# Patient Record
Sex: MALE | URBAN - METROPOLITAN AREA
[De-identification: names, ages, dates, MRNs, and addresses within clinical notes are randomized per-mention and may not be internally consistent; named-entity substitution may affect disease eponyms.]

---

## 2020-09-22 ENCOUNTER — HOSPITAL ENCOUNTER (OUTPATIENT)
Age: 70
Setting detail: SPECIMEN
Discharge: HOME | End: 2020-09-22
Payer: MEDICARE

## 2020-09-28 LAB — DERMATOLOGY PATHOLOGY REPORT: NORMAL

## 2020-12-14 ENCOUNTER — HOSPITAL ENCOUNTER (OUTPATIENT)
Age: 70
Setting detail: SPECIMEN
Discharge: HOME | End: 2020-12-14
Payer: MEDICARE

## 2020-12-16 LAB — DERMATOLOGY PATHOLOGY REPORT: NORMAL

## 2021-11-12 ENCOUNTER — HOSPITAL ENCOUNTER (OUTPATIENT)
Age: 71
Setting detail: SPECIMEN
Discharge: HOME | End: 2021-11-12
Payer: MEDICARE

## 2021-11-16 LAB — DERMATOLOGY PATHOLOGY REPORT: NORMAL

## 2023-09-25 ENCOUNTER — TELEPHONE (OUTPATIENT)
Age: 73
End: 2023-09-25

## 2023-09-25 ENCOUNTER — OFFICE VISIT (OUTPATIENT)
Age: 73
End: 2023-09-25

## 2023-09-25 DIAGNOSIS — H91.93 DECREASED HEARING OF BOTH EARS: Primary | ICD-10-CM

## 2023-09-25 NOTE — PROGRESS NOTES
Ear flush done bilaterally with warm water and H2O2. Patient tolerated well.  was successful  Curette not used   Dr MATA advised.

## 2023-09-25 NOTE — TELEPHONE ENCOUNTER
Both ears plugged. Would like to have nurse flush. Symptoms started over the weekend.  Notify pt ok to schedule nurse visit

## 2023-09-27 ENCOUNTER — OFFICE VISIT (OUTPATIENT)
Age: 73
End: 2023-09-27

## 2023-09-27 VITALS
DIASTOLIC BLOOD PRESSURE: 80 MMHG | RESPIRATION RATE: 16 BRPM | BODY MASS INDEX: 45.45 KG/M2 | TEMPERATURE: 97.2 F | WEIGHT: 272.8 LBS | HEIGHT: 65 IN | OXYGEN SATURATION: 95 % | SYSTOLIC BLOOD PRESSURE: 126 MMHG | HEART RATE: 69 BPM

## 2023-09-27 DIAGNOSIS — M79.18 MYOFASCIAL MUSCLE PAIN: Primary | ICD-10-CM

## 2023-09-27 DIAGNOSIS — G20.A1 PARKINSON'S DISEASE: ICD-10-CM

## 2023-09-27 DIAGNOSIS — I10 BENIGN ESSENTIAL HYPERTENSION: ICD-10-CM

## 2023-09-27 PROBLEM — N39.41 URGE INCONTINENCE: Status: ACTIVE | Noted: 2021-12-15

## 2023-09-27 PROBLEM — N40.0 BENIGN PROSTATIC HYPERPLASIA: Status: ACTIVE | Noted: 2023-07-20

## 2023-09-27 PROBLEM — M54.50 ACUTE BILATERAL LOW BACK PAIN WITHOUT SCIATICA: Status: ACTIVE | Noted: 2018-11-01

## 2023-09-27 PROBLEM — M51.26 HERNIATED LUMBAR INTERVERTEBRAL DISC: Status: ACTIVE | Noted: 2017-05-10

## 2023-09-27 PROBLEM — M75.22 BICEPS TENDONITIS ON LEFT: Status: ACTIVE | Noted: 2017-05-10

## 2023-09-27 PROBLEM — M54.31 SCIATICA OF RIGHT SIDE: Status: ACTIVE | Noted: 2018-02-20

## 2023-09-27 PROBLEM — M48.00 SPINAL STENOSIS: Status: ACTIVE | Noted: 2022-09-22

## 2023-09-27 PROBLEM — N28.1 RENAL CYST: Status: ACTIVE | Noted: 2017-11-08

## 2023-09-27 PROBLEM — R07.9 CHEST PAIN: Status: ACTIVE | Noted: 2021-01-29

## 2023-09-27 PROBLEM — N45.1 EPIDIDYMITIS: Status: ACTIVE | Noted: 2017-10-18

## 2023-09-27 PROBLEM — Z96.652 S/P LEFT UNICOMPARTMENTAL KNEE REPLACEMENT: Status: ACTIVE | Noted: 2017-09-26

## 2023-09-27 PROBLEM — M17.11 PRIMARY OSTEOARTHRITIS OF RIGHT KNEE: Status: ACTIVE | Noted: 2018-02-13

## 2023-09-27 PROBLEM — N52.01 ERECTILE DYSFUNCTION DUE TO ARTERIAL INSUFFICIENCY: Status: ACTIVE | Noted: 2017-08-24

## 2023-09-27 PROBLEM — T84.012A FAILED TOTAL KNEE, RIGHT (HCC): Status: ACTIVE | Noted: 2018-01-29

## 2023-09-27 RX ORDER — AMANTADINE HYDROCHLORIDE 100 MG/1
100 CAPSULE, GELATIN COATED ORAL DAILY
COMMUNITY

## 2023-09-27 RX ORDER — DUTASTERIDE 0.5 MG/1
1 CAPSULE, LIQUID FILLED ORAL DAILY
COMMUNITY
Start: 2023-02-06

## 2023-09-27 RX ORDER — POTASSIUM CHLORIDE 750 MG/1
10 CAPSULE, EXTENDED RELEASE ORAL DAILY
COMMUNITY

## 2023-09-27 RX ORDER — MELOXICAM 15 MG/1
15 TABLET ORAL DAILY
COMMUNITY

## 2023-09-27 RX ORDER — AMLODIPINE BESYLATE 10 MG/1
10 TABLET ORAL DAILY
COMMUNITY

## 2023-09-27 RX ORDER — GABAPENTIN 800 MG/1
1 TABLET ORAL DAILY
COMMUNITY
Start: 2023-09-10

## 2023-09-27 RX ORDER — TRIAMCINOLONE ACETONIDE 40 MG/ML
40 INJECTION, SUSPENSION INTRA-ARTICULAR; INTRAMUSCULAR ONCE
Status: COMPLETED | OUTPATIENT
Start: 2023-09-27 | End: 2023-09-27

## 2023-09-27 RX ORDER — MONTELUKAST SODIUM 4 MG/1
1 TABLET, CHEWABLE ORAL 2 TIMES DAILY
COMMUNITY
Start: 2023-06-05

## 2023-09-27 RX ORDER — NITROGLYCERIN 0.4 MG/1
1 TABLET SUBLINGUAL PRN
COMMUNITY

## 2023-09-27 RX ORDER — CARBIDOPA AND LEVODOPA 25; 100 MG/1; MG/1
1 TABLET, EXTENDED RELEASE ORAL DAILY
COMMUNITY
Start: 2022-10-26

## 2023-09-27 RX ORDER — TAMSULOSIN HYDROCHLORIDE 0.4 MG/1
1 CAPSULE ORAL DAILY
COMMUNITY
Start: 2023-08-19

## 2023-09-27 RX ORDER — ISOSORBIDE MONONITRATE 60 MG/1
1 TABLET, EXTENDED RELEASE ORAL DAILY
COMMUNITY
Start: 2023-07-19

## 2023-09-27 RX ADMIN — TRIAMCINOLONE ACETONIDE 40 MG: 40 INJECTION, SUSPENSION INTRA-ARTICULAR; INTRAMUSCULAR at 15:03

## 2023-09-27 SDOH — ECONOMIC STABILITY: HOUSING INSECURITY
IN THE LAST 12 MONTHS, WAS THERE A TIME WHEN YOU DID NOT HAVE A STEADY PLACE TO SLEEP OR SLEPT IN A SHELTER (INCLUDING NOW)?: NO

## 2023-09-27 SDOH — ECONOMIC STABILITY: INCOME INSECURITY: HOW HARD IS IT FOR YOU TO PAY FOR THE VERY BASICS LIKE FOOD, HOUSING, MEDICAL CARE, AND HEATING?: NOT HARD AT ALL

## 2023-09-27 SDOH — ECONOMIC STABILITY: FOOD INSECURITY: WITHIN THE PAST 12 MONTHS, THE FOOD YOU BOUGHT JUST DIDN'T LAST AND YOU DIDN'T HAVE MONEY TO GET MORE.: NEVER TRUE

## 2023-09-27 SDOH — ECONOMIC STABILITY: FOOD INSECURITY: WITHIN THE PAST 12 MONTHS, YOU WORRIED THAT YOUR FOOD WOULD RUN OUT BEFORE YOU GOT MONEY TO BUY MORE.: NEVER TRUE

## 2023-09-27 ASSESSMENT — PATIENT HEALTH QUESTIONNAIRE - PHQ9
SUM OF ALL RESPONSES TO PHQ QUESTIONS 1-9: 0
SUM OF ALL RESPONSES TO PHQ9 QUESTIONS 1 & 2: 0
SUM OF ALL RESPONSES TO PHQ QUESTIONS 1-9: 0
1. LITTLE INTEREST OR PLEASURE IN DOING THINGS: 0
2. FEELING DOWN, DEPRESSED OR HOPELESS: 0

## 2023-09-27 NOTE — PROGRESS NOTES
Kenalog 40mg was given to patient for Myofascial muscle pain. Tolerated well. Approved by Dr. Chary Scott.  Injection was given Left thigh, ABN was signed and copy was given to pt

## 2023-09-27 NOTE — PROGRESS NOTES
[facility]      Date of Visit:  2023  Patient Name: Lucho Acosta   Patient :  1950     CHIEF COMPLAINT/HPI:     Lucho Acosta is a 68 y.o. male who presents today for an general visit to be evaluated for the following condition(s):  Chief Complaint   Patient presents with    left leg swelling     Patient is here for left calf swelling for about a week. Patient states he injured it during water aerobics. States no discoloration only swelling. Patient was just here yesterday and had very successful wax removal process he is quite pleased about his hearing now. He has been going to water aerobics and boxing for Parkinson's condition and he has noticed strain of the left medial calf muscle while he was doing water aerobics. He remembers exactly when it happened. This was about a week ago it was persisting but today feels much better    REVIEW OF SYSTEM      Review of Systems    Patient has no other healthcare issues. No fever no sweats no chills. No chest pain nor shortness of breath. No nausea nor vomiting no diarrhea . No  complaints. No edema issues. REVIEWED INFORMATION      Allergies   Allergen Reactions    Ciprofloxacin      Leg cramping    Morphine Nausea And Vomiting       Current Outpatient Medications   Medication Sig Dispense Refill    colestipol (COLESTID) 1 g tablet Take 1 tablet by mouth in the morning and at bedtime      dutasteride (AVODART) 0.5 MG capsule Take 1 capsule by mouth daily      isosorbide mononitrate (IMDUR) 60 MG extended release tablet Take 1 tablet by mouth daily      meloxicam (MOBIC) 15 MG tablet Take 1 tablet by mouth daily      amLODIPine (NORVASC) 10 MG tablet Take 1 tablet by mouth daily      gabapentin (NEURONTIN) 800 MG tablet Take 1 tablet by mouth daily.       nitroGLYCERIN (NITROSTAT) 0.4 MG SL tablet Place 1 tablet under the tongue as needed      tamsulosin (FLOMAX) 0.4 MG capsule Take 1 capsule by mouth daily      carbidopa-levodopa (SINEMET

## 2023-10-16 RX ORDER — SPIRONOLACTONE 25 MG/1
25 TABLET ORAL DAILY
Qty: 90 TABLET | Refills: 3 | Status: SHIPPED | OUTPATIENT
Start: 2023-10-16

## 2023-10-17 RX ORDER — ISOSORBIDE MONONITRATE 60 MG/1
60 TABLET, EXTENDED RELEASE ORAL EVERY MORNING
Qty: 90 TABLET | Refills: 1 | Status: SHIPPED | OUTPATIENT
Start: 2023-10-17

## 2023-10-17 NOTE — TELEPHONE ENCOUNTER
Nemesio Magaña is calling to request a refill on the following medication(s):    Medication Request:  Requested Prescriptions     Pending Prescriptions Disp Refills    isosorbide mononitrate (IMDUR) 60 MG extended release tablet [Pharmacy Med Name: ISOSORBIDE MONONIT ER 60 MG TB] 90 tablet      Sig: TAKE ONE TABLET BY MOUTH EVERY MORNING       Last Visit Date (If Applicable):  Visit date not found    Next Visit Date:    11/16/2023

## 2023-11-09 RX ORDER — GABAPENTIN 800 MG/1
800 TABLET ORAL
Qty: 30 TABLET | Refills: 0 | Status: SHIPPED | OUTPATIENT
Start: 2023-11-09 | End: 2023-12-09

## 2023-11-09 NOTE — TELEPHONE ENCOUNTER
Agnieszka Cos is calling to request a refill on the following medication(s):    Medication Request:  Requested Prescriptions     Pending Prescriptions Disp Refills    gabapentin (NEURONTIN) 800 MG tablet [Pharmacy Med Name: GABAPENTIN 800 MG TABLET] 30 tablet      Sig: Take 1 tablet by mouth.        Last Visit Date (If Applicable):  Visit date not found    Next Visit Date:    11/16/2023

## 2023-11-16 ENCOUNTER — OFFICE VISIT (OUTPATIENT)
Age: 73
End: 2023-11-16

## 2023-11-16 VITALS
HEART RATE: 76 BPM | RESPIRATION RATE: 16 BRPM | DIASTOLIC BLOOD PRESSURE: 78 MMHG | HEIGHT: 65 IN | WEIGHT: 272 LBS | TEMPERATURE: 97.8 F | SYSTOLIC BLOOD PRESSURE: 126 MMHG | OXYGEN SATURATION: 95 % | BODY MASS INDEX: 45.32 KG/M2

## 2023-11-16 DIAGNOSIS — N18.32 STAGE 3B CHRONIC KIDNEY DISEASE (CKD) (HCC): ICD-10-CM

## 2023-11-16 DIAGNOSIS — E78.5 DYSLIPIDEMIA: ICD-10-CM

## 2023-11-16 DIAGNOSIS — M51.36 DDD (DEGENERATIVE DISC DISEASE), LUMBAR: ICD-10-CM

## 2023-11-16 DIAGNOSIS — G25.81 RESTLESS LEGS SYNDROME: ICD-10-CM

## 2023-11-16 DIAGNOSIS — I10 PRIMARY HYPERTENSION: ICD-10-CM

## 2023-11-16 DIAGNOSIS — R73.9 HYPERGLYCEMIA: ICD-10-CM

## 2023-11-16 DIAGNOSIS — R53.83 FATIGUE, UNSPECIFIED TYPE: ICD-10-CM

## 2023-11-16 DIAGNOSIS — I10 ESSENTIAL HYPERTENSION: Primary | ICD-10-CM

## 2023-11-16 DIAGNOSIS — F45.8 AEROPHAGIA: Primary | ICD-10-CM

## 2023-11-16 DIAGNOSIS — L85.3 XEROSIS CUTIS: ICD-10-CM

## 2023-11-16 DIAGNOSIS — B35.1 ONYCHOMYCOSIS: ICD-10-CM

## 2023-11-16 DIAGNOSIS — G20.A1 PARKINSON'S DISEASE WITHOUT DYSKINESIA, UNSPECIFIED WHETHER MANIFESTATIONS FLUCTUATE: ICD-10-CM

## 2023-11-16 DIAGNOSIS — G47.33 OBSTRUCTIVE SLEEP APNEA SYNDROME: ICD-10-CM

## 2023-11-16 DIAGNOSIS — E55.9 VITAMIN D DEFICIENCY: ICD-10-CM

## 2023-11-16 DIAGNOSIS — N40.0 BENIGN PROSTATIC HYPERPLASIA, UNSPECIFIED WHETHER LOWER URINARY TRACT SYMPTOMS PRESENT: ICD-10-CM

## 2023-11-16 PROBLEM — M51.369 DDD (DEGENERATIVE DISC DISEASE), LUMBAR: Status: ACTIVE | Noted: 2023-11-16

## 2023-11-16 PROBLEM — G47.30 SLEEP APNEA: Status: ACTIVE | Noted: 2023-11-16

## 2023-11-16 PROBLEM — K58.9 IRRITABLE BOWEL SYNDROME: Status: ACTIVE | Noted: 2023-11-16

## 2023-11-16 PROBLEM — M19.90 OSTEOARTHRITIS: Status: ACTIVE | Noted: 2023-11-16

## 2023-11-16 PROBLEM — C44.91 BASAL CELL CARCINOMA: Status: ACTIVE | Noted: 2023-11-16

## 2023-11-16 NOTE — PROGRESS NOTES
[facility]      Date of Visit:  2023  Patient Name: Bob Cortes   Patient :  1950     CHIEF COMPLAINT/HPI:     Bob Cortes is a 68 y.o. male who presents today for an general visit to be evaluated for the following condition(s):  Chief Complaint   Patient presents with    Check-Up     Patient Is here today for a 6 month checkup with labs from 23   Patient here for routine visit. Recently visited kids and grandkids in [place] but normally is doing swimming at the  for exercise. He will start back to boxing therapy when it opens at new location for his Parkinson's. When he was seen last he was having left leg pain that radiated down the leg, he had x-rays of back hips and did PT course, was given meloxicam.  Gabapentin dose was increased to 800 mg at bedtime. The pain is not gone but tolerable and slightly improved. No longer taking meloxicam.  He does not want to do anything further for it at this point. He did not schedule with pain management either. No changes in his Parkinson's meds. Sees urology regularly and they check PSA. REVIEW OF SYSTEM      Review of Systems   Musculoskeletal:  Positive for gait problem. All other systems reviewed and are negative. REVIEWED INFORMATION      Allergies   Allergen Reactions    Ciprofloxacin      Leg cramping    Lipitor [Atorvastatin] Myalgia    Morphine Nausea And Vomiting       Current Outpatient Medications   Medication Sig Dispense Refill    gabapentin (NEURONTIN) 800 MG tablet Take 1 tablet by mouth nightly for 30 days.  30 tablet 0    isosorbide mononitrate (IMDUR) 60 MG extended release tablet TAKE ONE TABLET BY MOUTH EVERY MORNING 90 tablet 1    spironolactone (ALDACTONE) 25 MG tablet TAKE ONE TABLET BY MOUTH DAILY 90 tablet 3    colestipol (COLESTID) 1 g tablet Take 1 tablet by mouth in the morning and at bedtime      dutasteride (AVODART) 0.5 MG capsule Take 1 capsule by mouth daily      amLODIPine (NORVASC) 10 MG

## 2023-11-17 NOTE — ASSESSMENT & PLAN NOTE
reviewed creatinine stable 1.14 GFR 68, strict control blood pressure to prevent decline of renal function.   Avoid NSAIDs

## 2023-11-17 NOTE — ASSESSMENT & PLAN NOTE
reviewed HDL 31 LDL 90, he had stopped statin within the last year, clarify in future if he is still on colestipol.   Continue to work on weight loss which he has been doing slowly as he had goal to try to reduce meds if possible and weight loss would bring him the best chance of being able to cut back on meds

## 2023-12-07 RX ORDER — GABAPENTIN 800 MG/1
800 TABLET ORAL NIGHTLY
Qty: 90 TABLET | Refills: 0 | Status: SHIPPED | OUTPATIENT
Start: 2023-12-07 | End: 2024-01-06

## 2023-12-07 NOTE — TELEPHONE ENCOUNTER
Alexandro Kim is calling to request a refill on the following medication(s):    Medication Request:  Requested Prescriptions     Pending Prescriptions Disp Refills    gabapentin (NEURONTIN) 800 MG tablet [Pharmacy Med Name: GABAPENTIN 800 MG TABLET] 30 tablet 0     Sig: Take 1 tablet by mouth nightly.       Last Visit Date (If Applicable):  11/16/2023    Next Visit Date:    5/21/2024

## 2024-02-16 ENCOUNTER — OFFICE VISIT (OUTPATIENT)
Age: 74
End: 2024-02-16

## 2024-02-16 VITALS
HEART RATE: 80 BPM | WEIGHT: 278 LBS | BODY MASS INDEX: 44.68 KG/M2 | HEIGHT: 66 IN | SYSTOLIC BLOOD PRESSURE: 124 MMHG | DIASTOLIC BLOOD PRESSURE: 68 MMHG | TEMPERATURE: 98.9 F | OXYGEN SATURATION: 94 % | RESPIRATION RATE: 12 BRPM

## 2024-02-16 DIAGNOSIS — N18.32 STAGE 3B CHRONIC KIDNEY DISEASE (CKD) (HCC): ICD-10-CM

## 2024-02-16 DIAGNOSIS — G20.A1 PARKINSON'S DISEASE, UNSPECIFIED WHETHER DYSKINESIA PRESENT, UNSPECIFIED WHETHER MANIFESTATIONS FLUCTUATE: ICD-10-CM

## 2024-02-16 DIAGNOSIS — N40.1 BENIGN PROSTATIC HYPERPLASIA WITH INCOMPLETE BLADDER EMPTYING: Primary | ICD-10-CM

## 2024-02-16 DIAGNOSIS — E66.01 OBESITY, CLASS III, BMI 40-49.9 (MORBID OBESITY) (HCC): ICD-10-CM

## 2024-02-16 DIAGNOSIS — R39.14 BENIGN PROSTATIC HYPERPLASIA WITH INCOMPLETE BLADDER EMPTYING: Primary | ICD-10-CM

## 2024-02-16 DIAGNOSIS — R09.81 CONGESTION OF PARANASAL SINUS: ICD-10-CM

## 2024-02-16 DIAGNOSIS — N39.41 URGE INCONTINENCE: ICD-10-CM

## 2024-02-16 DIAGNOSIS — I10 ESSENTIAL HYPERTENSION: ICD-10-CM

## 2024-02-16 RX ORDER — IPRATROPIUM BROMIDE 21 UG/1
2 SPRAY, METERED NASAL 2 TIMES DAILY PRN
Qty: 30 ML | Refills: 3 | Status: SHIPPED | OUTPATIENT
Start: 2024-02-16

## 2024-02-16 NOTE — ASSESSMENT & PLAN NOTE
November labs show creatinine stable 1.14 GFR 68, strict control blood pressure to prevent decline of renal function. Avoid NSAIDs

## 2024-02-16 NOTE — PROGRESS NOTES
[facility]     Date of Visit:  2024  Patient Name: Alexandro Kim   Patient :  1950     CHIEF COMPLAINT/HPI:     Alexandro Kim is a 73 y.o. male who presents today for an general visit to be evaluated for the following condition(s):  Chief Complaint   Patient presents with    Procedure     Urolift surg [facility]     Patient here to discuss possible urologic procedure in the near future.  He follows with urologist for BPH with LUTS.  He has been on gemtesa, Avodart and Flomax for his symptoms and he continues to have urinary urgency with incontinence and urinary frequency.  He feels it is affecting his quality of life.  His wife wanted him to discuss surgical options with PCP and his neurologist Dr. Tovar who he sees for Parkinson's disease.  Other than that he complains of intermittent nasal congestion that occurs a few times a week, when it occurs through the night he will have to take off his CPAP, he does not have sneezing nasal drainage or other allergy symptoms.  They have a few air filters in their home.  No sinus symptoms.    REVIEW OF SYSTEM      Review of Systems   Constitutional:  Positive for fatigue.   HENT:  Positive for congestion. Negative for postnasal drip, sinus pressure, sinus pain, sneezing and sore throat.    Respiratory:  Negative for shortness of breath.    Cardiovascular:  Negative for chest pain.   Gastrointestinal:  Negative for abdominal pain and nausea.   Genitourinary:  Positive for frequency and urgency.   Musculoskeletal:  Positive for gait problem.   Psychiatric/Behavioral:  The patient is not nervous/anxious.    All other systems reviewed and are negative.        REVIEWED INFORMATION      Allergies   Allergen Reactions    Ciprofloxacin      Leg cramping    Lipitor [Atorvastatin] Myalgia    Morphine Nausea And Vomiting       Current Outpatient Medications   Medication Sig Dispense Refill    ipratropium (ATROVENT) 0.03 % nasal spray 2 sprays by Each Nostril

## 2024-02-16 NOTE — ASSESSMENT & PLAN NOTE
reviewed urology recent notes and surgical options discussed with patient at that time and possible complications.  Discussed with patient if he has maximized medical patient trial without relief of symptoms and his quality of life is significantly affected by his symptoms that would push for surgery, it sounds like the combination of TUR and UroLift as suggested by urology makes sense regarding his tight bladder neck and therefore less risk for incontinence possibly.  Encouraged pt to discuss with Dr. webster if he has any say either way in his experience with Parkinson's patients undergoing prostate procedures/surgeries

## 2024-03-11 RX ORDER — GABAPENTIN 800 MG/1
800 TABLET ORAL NIGHTLY
Qty: 90 TABLET | Refills: 0 | Status: SHIPPED | OUTPATIENT
Start: 2024-03-11 | End: 2024-06-09

## 2024-03-11 NOTE — TELEPHONE ENCOUNTER
Alexandro Kim is calling to request a refill on the following medication(s):    Medication Request:  Requested Prescriptions     Pending Prescriptions Disp Refills    gabapentin (NEURONTIN) 800 MG tablet [Pharmacy Med Name: GABAPENTIN 800 MG TABLET] 90 tablet 0     Sig: Take 1 tablet by mouth nightly.       Last Visit Date (If Applicable):  2/16/2024    Next Visit Date:    5/21/2024

## 2024-05-13 RX ORDER — ISOSORBIDE MONONITRATE 60 MG/1
60 TABLET, EXTENDED RELEASE ORAL EVERY MORNING
Qty: 90 TABLET | Refills: 1 | Status: SHIPPED | OUTPATIENT
Start: 2024-05-13

## 2024-05-13 NOTE — TELEPHONE ENCOUNTER
Alexandro Kim is calling to request a refill on the following medication(s):    Medication Request:  Requested Prescriptions     Pending Prescriptions Disp Refills    isosorbide mononitrate (IMDUR) 60 MG extended release tablet [Pharmacy Med Name: ISOSORBIDE MONONIT ER 60 MG TB] 90 tablet 1     Sig: TAKE ONE TABLET BY MOUTH EVERY MORNING       Last Visit Date (If Applicable):  2/16/2024    Next Visit Date:    5/21/2024

## 2024-05-14 LAB
BASOPHILS ABSOLUTE: 0 /ΜL
BASOPHILS RELATIVE PERCENT: 0.4 %
CHOLESTEROL, TOTAL: 142 MG/DL
CHOLESTEROL/HDL RATIO: 4.7
EOSINOPHILS ABSOLUTE: 0.1 /ΜL
EOSINOPHILS RELATIVE PERCENT: 2 %
ESTIMATED AVERAGE GLUCOSE: 100
HBA1C MFR BLD: 5.1 %
HCT VFR BLD CALC: 41.6 % (ref 41–53)
HDLC SERPL-MCNC: 30 MG/DL (ref 35–70)
HEMOGLOBIN: 14.9 G/DL (ref 13.5–17.5)
LDL CHOLESTEROL CALCULATED: 89 MG/DL (ref 0–160)
LYMPHOCYTES ABSOLUTE: 1.7 /ΜL
LYMPHOCYTES RELATIVE PERCENT: 27.5 %
MCH RBC QN AUTO: 31.9 PG
MCHC RBC AUTO-ENTMCNC: 35.7 G/DL
MCV RBC AUTO: 89 FL
MONOCYTES ABSOLUTE: 0.5 /ΜL
MONOCYTES RELATIVE PERCENT: 8.6 %
NEUTROPHILS ABSOLUTE: 3.9 /ΜL
NEUTROPHILS RELATIVE PERCENT: 61.5 %
NONHDLC SERPL-MCNC: ABNORMAL MG/DL
PDW BLD-RTO: NORMAL %
PLATELET # BLD: 169 K/ΜL
PMV BLD AUTO: 9.3 FL
RBC # BLD: 4.66 10^6/ΜL
TRIGL SERPL-MCNC: 113 MG/DL
VITAMIN D 25-HYDROXY: 71.3
VITAMIN D2, 25 HYDROXY: NORMAL
VITAMIN D3,25 HYDROXY: NORMAL
VLDLC SERPL CALC-MCNC: 23 MG/DL
WBC # BLD: 6.3 10^3/ML

## 2024-05-15 DIAGNOSIS — R73.9 HYPERGLYCEMIA: ICD-10-CM

## 2024-05-15 DIAGNOSIS — E55.9 VITAMIN D DEFICIENCY: ICD-10-CM

## 2024-05-15 DIAGNOSIS — R53.83 FATIGUE, UNSPECIFIED TYPE: ICD-10-CM

## 2024-05-15 DIAGNOSIS — E78.5 DYSLIPIDEMIA: ICD-10-CM

## 2024-05-15 DIAGNOSIS — I10 ESSENTIAL HYPERTENSION: ICD-10-CM

## 2024-05-21 ENCOUNTER — OFFICE VISIT (OUTPATIENT)
Age: 74
End: 2024-05-21

## 2024-05-21 VITALS
DIASTOLIC BLOOD PRESSURE: 80 MMHG | RESPIRATION RATE: 14 BRPM | BODY MASS INDEX: 45.19 KG/M2 | WEIGHT: 280 LBS | TEMPERATURE: 98.1 F | OXYGEN SATURATION: 98 % | SYSTOLIC BLOOD PRESSURE: 120 MMHG | HEART RATE: 66 BPM

## 2024-05-21 DIAGNOSIS — N18.32 STAGE 3B CHRONIC KIDNEY DISEASE (CKD) (HCC): Primary | ICD-10-CM

## 2024-05-21 DIAGNOSIS — H61.23 BILATERAL IMPACTED CERUMEN: ICD-10-CM

## 2024-05-21 DIAGNOSIS — R53.83 FATIGUE, UNSPECIFIED TYPE: ICD-10-CM

## 2024-05-21 DIAGNOSIS — I10 ESSENTIAL HYPERTENSION: ICD-10-CM

## 2024-05-21 DIAGNOSIS — G47.33 OBSTRUCTIVE SLEEP APNEA SYNDROME: ICD-10-CM

## 2024-05-21 DIAGNOSIS — E53.8 VITAMIN B12 DEFICIENCY: ICD-10-CM

## 2024-05-21 DIAGNOSIS — G20.A1 PARKINSON'S DISEASE, UNSPECIFIED WHETHER DYSKINESIA PRESENT, UNSPECIFIED WHETHER MANIFESTATIONS FLUCTUATE (HCC): ICD-10-CM

## 2024-05-21 DIAGNOSIS — E78.5 DYSLIPIDEMIA: ICD-10-CM

## 2024-05-21 DIAGNOSIS — E55.9 VITAMIN D DEFICIENCY: ICD-10-CM

## 2024-05-21 DIAGNOSIS — R73.9 HYPERGLYCEMIA: ICD-10-CM

## 2024-05-21 RX ORDER — HYDROCHLOROTHIAZIDE 12.5 MG/1
12.5 TABLET ORAL DAILY
Qty: 30 TABLET | Refills: 3 | Status: SHIPPED | OUTPATIENT
Start: 2024-05-21

## 2024-05-21 ASSESSMENT — ENCOUNTER SYMPTOMS
SORE THROAT: 0
ABDOMINAL PAIN: 0
SHORTNESS OF BREATH: 0
CONSTIPATION: 0

## 2024-05-21 NOTE — ASSESSMENT & PLAN NOTE
this has improved. reviewed recent labs creatinine normal at 1.17 and GFR 66; compared to November labs show creatinine stable 1.14 GFR 68, strict control blood pressure to prevent decline of renal function. Avoid NSAIDs

## 2024-05-21 NOTE — PROGRESS NOTES
[facility]     Date of Visit:  2024  Patient Name: Alexandro Kim   Patient :  1950     CHIEF COMPLAINT/HPI:     Alexandro Kim is a 73 y.o. male who presents today for an general visit to be evaluated for the following condition(s):  Chief Complaint   Patient presents with    Hypertension     Patient is   here for  6  month  check  on  Hypertension labs  done  in  care  everywhere  also  has  plugged  ears    Patient here for routine visit.  Recently underwent TURP and UroLift, has not noticed effect yet but could take 6 months, he is to stay on avodart and Flomax for at least 3 months.  He tried Afrin for nasal congestion which was prohibiting use of CPAP and it has helped.  Both the ears are full, he has a tendency for wax.  No chest pain or shortness of breath.  He takes gabapentin at bedtime for restless leg symptoms.  He goes to the  to swim every day.  Boxing therapy has not reopened yet but plans to do that when it does.  He has been on colestipol for diarrhea and not cholesterol.  He has had swelling in his legs, left more so than right but it seems little worse lately, no calf pain or redness    REVIEW OF SYSTEM      Review of Systems   Constitutional:  Negative for fatigue and unexpected weight change.   HENT:  Positive for hearing loss. Negative for congestion and sore throat.    Respiratory:  Negative for shortness of breath.    Cardiovascular:  Negative for chest pain.   Gastrointestinal:  Negative for abdominal pain and constipation.   Musculoskeletal:  Positive for gait problem.   Skin:  Negative for rash.   Neurological:  Negative for weakness, light-headedness, numbness and headaches.   Psychiatric/Behavioral:  The patient is not nervous/anxious.    All other systems reviewed and are negative.        REVIEWED INFORMATION      Allergies   Allergen Reactions    Ciprofloxacin      Leg cramping    Lipitor [Atorvastatin] Myalgia    Morphine Nausea And Vomiting       Current

## 2024-05-21 NOTE — PATIENT INSTRUCTIONS
Cut amlodipine 10mg  in 1/2 , take 1/2 tablet daily  (5mg daily)  Start hydrochlorothiazide 12.5mg daily  Get lab in 2wks to check potassium  Call office with update in 2wks on swelling in legs     Get fasting labs done in about 8 months

## 2024-05-21 NOTE — ASSESSMENT & PLAN NOTE
blood pressure well-controlled on spironolactone, Imdur and amlodipine.  He has been having leg edema likely from amlodipine, take half tablet of amlodipine daily and add hydrochlorothiazide 12.5 mg daily, wear compression stockings, get BMP in 2 weeks and call with update on his edema to see if need to titrate hydrochlorothiazide upward and discontinue amlodipine entirely

## 2024-05-21 NOTE — PROGRESS NOTES
Bilateral  ear  lavage  was   done   using  warm  water  and  H2O2  no  curette used  patient  tolerated the  procedure  well    Patient returned call. Patient states she does not need a refill at this time. Patient states she still takes this medication occasionally for sleep. Please advise.

## 2024-05-21 NOTE — ASSESSMENT & PLAN NOTE
reviewed HDL 38 LDL 89, he stopped statin last year and has remained in good control with diet measures.  He takes colestipol for diarrhea not for cholesterol

## 2024-06-13 RX ORDER — GABAPENTIN 800 MG/1
800 TABLET ORAL NIGHTLY
Qty: 90 TABLET | Refills: 0 | Status: SHIPPED | OUTPATIENT
Start: 2024-06-13 | End: 2024-09-11

## 2024-06-13 NOTE — TELEPHONE ENCOUNTER
Alexandro Kim is calling to request a refill on the following medication(s):    Medication Request:  Requested Prescriptions     Pending Prescriptions Disp Refills    gabapentin (NEURONTIN) 800 MG tablet [Pharmacy Med Name: GABAPENTIN 800 MG TABLET] 90 tablet 0     Sig: Take 1 tablet by mouth nightly.       Last Visit Date (If Applicable):  5/21/2024    Next Visit Date:    9/25/2024

## 2024-06-19 LAB
BUN BLDV-MCNC: 16 MG/DL
CALCIUM SERPL-MCNC: 9.4 MG/DL
CHLORIDE BLD-SCNC: 101 MMOL/L
CO2: 29 MMOL/L
CREAT SERPL-MCNC: 1.19 MG/DL
EGFR: 65
GLUCOSE BLD-MCNC: 97 MG/DL
POTASSIUM SERPL-SCNC: 4.1 MMOL/L
SODIUM BLD-SCNC: 139 MMOL/L

## 2024-06-19 RX ORDER — AMLODIPINE BESYLATE 10 MG/1
10 TABLET ORAL DAILY
Qty: 90 TABLET | Refills: 1 | Status: SHIPPED | OUTPATIENT
Start: 2024-06-19

## 2024-06-21 DIAGNOSIS — I10 ESSENTIAL HYPERTENSION: ICD-10-CM

## 2024-08-13 RX ORDER — SPIRONOLACTONE 25 MG/1
25 TABLET ORAL DAILY
Qty: 90 TABLET | Refills: 3 | Status: SHIPPED | OUTPATIENT
Start: 2024-08-13

## 2024-08-13 RX ORDER — ISOSORBIDE MONONITRATE 60 MG/1
60 TABLET, EXTENDED RELEASE ORAL EVERY MORNING
Qty: 90 TABLET | Refills: 1 | Status: SHIPPED | OUTPATIENT
Start: 2024-08-13

## 2024-08-13 RX ORDER — AMLODIPINE BESYLATE 10 MG/1
10 TABLET ORAL DAILY
Qty: 90 TABLET | Refills: 1 | Status: SHIPPED | OUTPATIENT
Start: 2024-08-13

## 2024-08-13 RX ORDER — HYDROCHLOROTHIAZIDE 12.5 MG/1
12.5 TABLET ORAL DAILY
Qty: 90 TABLET | Refills: 2 | Status: SHIPPED | OUTPATIENT
Start: 2024-08-13

## 2024-08-13 NOTE — TELEPHONE ENCOUNTER
Alexandro Kim is calling to request a refill on the following medication(s):    Medication Request:  Requested Prescriptions     Pending Prescriptions Disp Refills    hydroCHLOROthiazide 12.5 MG tablet 90 tablet 2     Sig: Take 1 tablet by mouth daily    spironolactone (ALDACTONE) 25 MG tablet 90 tablet 3     Sig: Take 1 tablet by mouth daily    isosorbide mononitrate (IMDUR) 60 MG extended release tablet 90 tablet 1     Sig: Take 1 tablet by mouth every morning    amLODIPine (NORVASC) 10 MG tablet 90 tablet 1     Sig: Take 1 tablet by mouth daily       Last Visit Date (If Applicable):  5/21/2024    Next Visit Date:    9/25/2024

## 2024-08-13 NOTE — TELEPHONE ENCOUNTER
Alexandro Kim is calling to request a refill on the following medication(s):    Medication Request:  Requested Prescriptions     Pending Prescriptions Disp Refills    hydroCHLOROthiazide 12.5 MG tablet 90 tablet 2     Sig: Take 1 tablet by mouth daily       Last Visit Date (If Applicable):  5/21/2024    Next Visit Date:    9/25/2024

## 2024-09-05 RX ORDER — HYDROCHLOROTHIAZIDE 12.5 MG/1
12.5 TABLET ORAL DAILY
Qty: 30 TABLET | Refills: 4 | Status: SHIPPED | OUTPATIENT
Start: 2024-09-05

## 2024-09-05 NOTE — TELEPHONE ENCOUNTER
Alexandro Kim is calling to request a refill on the following medication(s):    Medication Request:  Requested Prescriptions     Pending Prescriptions Disp Refills    hydroCHLOROthiazide 12.5 MG tablet [Pharmacy Med Name: hydroCHLOROthiazide 12.5 MG TABLET] 30 tablet      Sig: TAKE 1 TABLET BY MOUTH DAILY       Last Visit Date (If Applicable):  5/21/2024    Next Visit Date:    9/25/2024

## 2024-09-13 RX ORDER — GABAPENTIN 800 MG/1
800 TABLET ORAL NIGHTLY
Qty: 90 TABLET | Refills: 0 | Status: SHIPPED | OUTPATIENT
Start: 2024-09-13 | End: 2024-12-12

## 2024-09-17 LAB
BASOPHILS ABSOLUTE: 0.1 /ΜL
BASOPHILS RELATIVE PERCENT: 1 %
CHOLESTEROL, TOTAL: 131 MG/DL
CHOLESTEROL/HDL RATIO: 4.9
EOSINOPHILS ABSOLUTE: 0.1 /ΜL
EOSINOPHILS RELATIVE PERCENT: 1.8 %
ESTIMATED AVERAGE GLUCOSE: 100
HBA1C MFR BLD: 5.1 %
HCT VFR BLD CALC: 45.6 % (ref 41–53)
HDLC SERPL-MCNC: 27 MG/DL (ref 35–70)
HEMOGLOBIN: 15.8 G/DL (ref 13.5–17.5)
LDL CHOLESTEROL: 75
LYMPHOCYTES ABSOLUTE: 2.3 /ΜL
LYMPHOCYTES RELATIVE PERCENT: 29.4 %
MCH RBC QN AUTO: 31.4 PG
MCHC RBC AUTO-ENTMCNC: 34.7 G/DL
MCV RBC AUTO: 91 FL
MONOCYTES ABSOLUTE: 0.6 /ΜL
MONOCYTES RELATIVE PERCENT: 8 %
NEUTROPHILS ABSOLUTE: 4.7 /ΜL
NEUTROPHILS RELATIVE PERCENT: 59.8 %
NONHDLC SERPL-MCNC: ABNORMAL MG/DL
PDW BLD-RTO: 13.6 %
PLATELET # BLD: 198 K/ΜL
PMV BLD AUTO: 9 FL
RBC # BLD: 5.03 10^6/ΜL
TRIGL SERPL-MCNC: 147 MG/DL
TSH SERPL DL<=0.05 MIU/L-ACNC: 1.37 UIU/ML
VITAMIN B-12: 702
VITAMIN D 25-HYDROXY: 98.6
VITAMIN D2, 25 HYDROXY: NORMAL
VITAMIN D3,25 HYDROXY: NORMAL
VLDLC SERPL CALC-MCNC: 29 MG/DL
WBC # BLD: 7.8 10^3/ML

## 2024-09-18 DIAGNOSIS — E55.9 VITAMIN D DEFICIENCY: ICD-10-CM

## 2024-09-18 DIAGNOSIS — E53.8 VITAMIN B12 DEFICIENCY: ICD-10-CM

## 2024-09-18 DIAGNOSIS — I10 ESSENTIAL HYPERTENSION: ICD-10-CM

## 2024-09-18 DIAGNOSIS — R73.9 HYPERGLYCEMIA: ICD-10-CM

## 2024-09-18 DIAGNOSIS — R53.83 FATIGUE, UNSPECIFIED TYPE: ICD-10-CM

## 2024-09-18 DIAGNOSIS — E78.5 DYSLIPIDEMIA: ICD-10-CM

## 2024-09-18 DIAGNOSIS — N18.32 STAGE 3B CHRONIC KIDNEY DISEASE (CKD) (HCC): ICD-10-CM

## 2024-09-25 ENCOUNTER — OFFICE VISIT (OUTPATIENT)
Age: 74
End: 2024-09-25

## 2024-09-25 VITALS
DIASTOLIC BLOOD PRESSURE: 74 MMHG | SYSTOLIC BLOOD PRESSURE: 124 MMHG | HEART RATE: 63 BPM | BODY MASS INDEX: 43.87 KG/M2 | HEIGHT: 66 IN | OXYGEN SATURATION: 96 % | WEIGHT: 273 LBS

## 2024-09-25 DIAGNOSIS — N40.0 BENIGN PROSTATIC HYPERPLASIA, UNSPECIFIED WHETHER LOWER URINARY TRACT SYMPTOMS PRESENT: ICD-10-CM

## 2024-09-25 DIAGNOSIS — Z00.00 MEDICARE ANNUAL WELLNESS VISIT, SUBSEQUENT: Primary | ICD-10-CM

## 2024-09-25 DIAGNOSIS — R73.9 HYPERGLYCEMIA: ICD-10-CM

## 2024-09-25 DIAGNOSIS — R53.83 FATIGUE, UNSPECIFIED TYPE: ICD-10-CM

## 2024-09-25 DIAGNOSIS — E55.9 VITAMIN D DEFICIENCY: ICD-10-CM

## 2024-09-25 DIAGNOSIS — G47.33 OBSTRUCTIVE SLEEP APNEA SYNDROME: ICD-10-CM

## 2024-09-25 DIAGNOSIS — I10 BENIGN ESSENTIAL HYPERTENSION: ICD-10-CM

## 2024-09-25 DIAGNOSIS — K58.0 IRRITABLE BOWEL SYNDROME WITH DIARRHEA: ICD-10-CM

## 2024-09-25 DIAGNOSIS — E66.01 OBESITY, CLASS III, BMI 40-49.9 (MORBID OBESITY): ICD-10-CM

## 2024-09-25 DIAGNOSIS — E53.8 VITAMIN B12 DEFICIENCY: ICD-10-CM

## 2024-09-25 DIAGNOSIS — N18.32 STAGE 3B CHRONIC KIDNEY DISEASE (CKD) (HCC): ICD-10-CM

## 2024-09-25 DIAGNOSIS — E78.5 DYSLIPIDEMIA: ICD-10-CM

## 2024-09-25 DIAGNOSIS — G20.A1 PARKINSON'S DISEASE, UNSPECIFIED WHETHER DYSKINESIA PRESENT, UNSPECIFIED WHETHER MANIFESTATIONS FLUCTUATE (HCC): ICD-10-CM

## 2024-09-25 PROBLEM — N32.0 BLADDER OUTLET OBSTRUCTION: Status: ACTIVE | Noted: 2024-02-05

## 2024-09-25 RX ORDER — EVENING PRIMROSE OIL 500 MG
100 CAPSULE ORAL DAILY
COMMUNITY

## 2024-09-25 ASSESSMENT — PATIENT HEALTH QUESTIONNAIRE - PHQ9
1. LITTLE INTEREST OR PLEASURE IN DOING THINGS: NOT AT ALL
SUM OF ALL RESPONSES TO PHQ9 QUESTIONS 1 & 2: 0
SUM OF ALL RESPONSES TO PHQ QUESTIONS 1-9: 0
2. FEELING DOWN, DEPRESSED OR HOPELESS: NOT AT ALL

## 2024-09-25 ASSESSMENT — LIFESTYLE VARIABLES
HOW MANY STANDARD DRINKS CONTAINING ALCOHOL DO YOU HAVE ON A TYPICAL DAY: PATIENT DOES NOT DRINK
HOW OFTEN DO YOU HAVE A DRINK CONTAINING ALCOHOL: MONTHLY OR LESS

## 2024-10-25 PROBLEM — Z00.00 MEDICARE ANNUAL WELLNESS VISIT, SUBSEQUENT: Status: RESOLVED | Noted: 2024-09-25 | Resolved: 2024-10-25

## 2024-11-06 ENCOUNTER — HOSPITAL ENCOUNTER (OUTPATIENT)
Age: 74
Discharge: HOME | End: 2024-11-08
Payer: MEDICARE

## 2024-11-06 ENCOUNTER — OFFICE VISIT (OUTPATIENT)
Age: 74
End: 2024-11-06

## 2024-11-06 ENCOUNTER — HOSPITAL ENCOUNTER (OUTPATIENT)
Dept: GENERAL RADIOLOGY | Age: 74
Discharge: HOME | End: 2024-11-08
Payer: MEDICARE

## 2024-11-06 VITALS
HEART RATE: 88 BPM | OXYGEN SATURATION: 97 % | DIASTOLIC BLOOD PRESSURE: 82 MMHG | WEIGHT: 268 LBS | HEIGHT: 66 IN | BODY MASS INDEX: 43.07 KG/M2 | SYSTOLIC BLOOD PRESSURE: 134 MMHG

## 2024-11-06 DIAGNOSIS — M54.50 LUMBAR BACK PAIN: Primary | ICD-10-CM

## 2024-11-06 DIAGNOSIS — M54.50 LUMBAR BACK PAIN: ICD-10-CM

## 2024-11-06 PROCEDURE — 72100 X-RAY EXAM L-S SPINE 2/3 VWS: CPT

## 2024-11-06 RX ORDER — METHYLPREDNISOLONE ACETATE 80 MG/ML
80 INJECTION, SUSPENSION INTRA-ARTICULAR; INTRALESIONAL; INTRAMUSCULAR; SOFT TISSUE ONCE
Status: COMPLETED | OUTPATIENT
Start: 2024-11-06 | End: 2024-11-06

## 2024-11-06 RX ADMIN — METHYLPREDNISOLONE ACETATE 80 MG: 80 INJECTION, SUSPENSION INTRA-ARTICULAR; INTRALESIONAL; INTRAMUSCULAR; SOFT TISSUE at 08:30

## 2024-11-06 SDOH — ECONOMIC STABILITY: INCOME INSECURITY: HOW HARD IS IT FOR YOU TO PAY FOR THE VERY BASICS LIKE FOOD, HOUSING, MEDICAL CARE, AND HEATING?: NOT HARD AT ALL

## 2024-11-06 SDOH — ECONOMIC STABILITY: FOOD INSECURITY: WITHIN THE PAST 12 MONTHS, THE FOOD YOU BOUGHT JUST DIDN'T LAST AND YOU DIDN'T HAVE MONEY TO GET MORE.: NEVER TRUE

## 2024-11-06 SDOH — ECONOMIC STABILITY: FOOD INSECURITY: WITHIN THE PAST 12 MONTHS, YOU WORRIED THAT YOUR FOOD WOULD RUN OUT BEFORE YOU GOT MONEY TO BUY MORE.: NEVER TRUE

## 2024-11-06 NOTE — PROGRESS NOTES
After obtaining consent, and per orders of Dr. Hernandez, injection of depo 80 given in Ventrogluteal Left  by CURT PORTILLO MA. ABN was signed prior to injection, copy was given to the patient. Patient tolerated the injection well.  
No   Physical Activity: Insufficiently Active (9/25/2024)    Exercise Vital Sign     Days of Exercise per Week: 2 days     Minutes of Exercise per Session: 40 min   Housing Stability: Unknown (11/6/2024)    Housing Stability Vital Sign     Homeless in the Last Year: No        PHYSICAL EXAM     /82   Pulse 88   Ht 1.676 m (5' 6\")   Wt 121.6 kg (268 lb)   SpO2 97%   BMI 43.26 kg/m²    General: A & O x3, No acute distress  MSK: difficulty getting from seated to standing position, lower lumbar spine tender to palpation along with right lower back and buttocks, negative straight leg test    ASSESSMENT/PLAN     1. Lumbar back pain  -     XR LUMBAR SPINE (2-3 VIEWS); Future  -     methylPREDNISolone acetate (DEPO-MEDROL) injection 80 mg; 80 mg, IntraMUSCular, ONCE, 1 dose, On Wed 11/6/24 at 0845  -     tiZANidine (ZANAFLEX) 4 MG tablet; Take 1 tablet by mouth 3 times daily as needed (muscle spasms), Disp-30 tablet, R-0Normal  -     External Referral To Physical Therapy   -will get xray as prior xray from 2023 mentioned possible compression fracture of thoracic spine.  Will give depo medrol today.  Patient to continue ibupofen and will start muscle relaxer as needed along with physical therapy.  If no improvement if 4-6 weeks, will see patient back for possible MRI order.      Orders Placed This Encounter   Medications    methylPREDNISolone acetate (DEPO-MEDROL) injection 80 mg    tiZANidine (ZANAFLEX) 4 MG tablet     Sig: Take 1 tablet by mouth 3 times daily as needed (muscle spasms)     Dispense:  30 tablet     Refill:  0       No follow-ups on file.        Electronically signed by Amalia Hernandez MD on 11/6/2024 at 8:30 AM

## 2024-11-15 ENCOUNTER — HOSPITAL ENCOUNTER (OUTPATIENT)
Age: 74
Setting detail: SPECIMEN
Discharge: HOME | End: 2024-11-15

## 2024-11-15 ENCOUNTER — OFFICE VISIT (OUTPATIENT)
Age: 74
End: 2024-11-15

## 2024-11-15 VITALS
BODY MASS INDEX: 43.39 KG/M2 | WEIGHT: 270 LBS | HEIGHT: 66 IN | TEMPERATURE: 97.7 F | SYSTOLIC BLOOD PRESSURE: 132 MMHG | DIASTOLIC BLOOD PRESSURE: 84 MMHG | OXYGEN SATURATION: 95 % | HEART RATE: 96 BPM

## 2024-11-15 DIAGNOSIS — E53.8 VITAMIN B12 DEFICIENCY: ICD-10-CM

## 2024-11-15 DIAGNOSIS — R06.02 SHORTNESS OF BREATH: ICD-10-CM

## 2024-11-15 DIAGNOSIS — E55.9 VITAMIN D DEFICIENCY: ICD-10-CM

## 2024-11-15 DIAGNOSIS — D50.8 OTHER IRON DEFICIENCY ANEMIA: ICD-10-CM

## 2024-11-15 DIAGNOSIS — E78.5 DYSLIPIDEMIA: ICD-10-CM

## 2024-11-15 DIAGNOSIS — R35.0 URINARY FREQUENCY: ICD-10-CM

## 2024-11-15 DIAGNOSIS — I10 ESSENTIAL HYPERTENSION: ICD-10-CM

## 2024-11-15 DIAGNOSIS — N18.32 STAGE 3B CHRONIC KIDNEY DISEASE (CKD) (HCC): ICD-10-CM

## 2024-11-15 DIAGNOSIS — R53.83 FATIGUE, UNSPECIFIED TYPE: ICD-10-CM

## 2024-11-15 DIAGNOSIS — R73.9 HYPERGLYCEMIA: ICD-10-CM

## 2024-11-15 DIAGNOSIS — E78.5 DYSLIPIDEMIA: Primary | ICD-10-CM

## 2024-11-15 DIAGNOSIS — G20.A1 PARKINSON'S DISEASE, UNSPECIFIED WHETHER DYSKINESIA PRESENT, UNSPECIFIED WHETHER MANIFESTATIONS FLUCTUATE (HCC): ICD-10-CM

## 2024-11-15 DIAGNOSIS — K58.8 OTHER IRRITABLE BOWEL SYNDROME: ICD-10-CM

## 2024-11-15 DIAGNOSIS — M54.50 LUMBAR BACK PAIN: ICD-10-CM

## 2024-11-15 DIAGNOSIS — R06.00 DYSPNEA, UNSPECIFIED TYPE: ICD-10-CM

## 2024-11-15 DIAGNOSIS — I10 BENIGN ESSENTIAL HYPERTENSION: ICD-10-CM

## 2024-11-15 DIAGNOSIS — G47.39 OTHER SLEEP APNEA: ICD-10-CM

## 2024-11-15 PROBLEM — D64.9 ABSOLUTE ANEMIA: Status: ACTIVE | Noted: 2024-11-15

## 2024-11-15 LAB
25(OH)D3 SERPL-MCNC: 66.5 NG/ML (ref 30–100)
ALBUMIN SERPL-MCNC: 4.5 G/DL (ref 3.5–5.2)
ALBUMIN/GLOB SERPL: 2 {RATIO} (ref 1–2.5)
ALP SERPL-CCNC: 77 U/L (ref 40–129)
ALT SERPL-CCNC: <5 U/L (ref 10–50)
ANION GAP SERPL CALCULATED.3IONS-SCNC: 13 MMOL/L (ref 9–16)
AST SERPL-CCNC: 21 U/L (ref 10–50)
BASOPHILS # BLD: 0.06 K/UL (ref 0–0.2)
BASOPHILS NFR BLD: 1 % (ref 0–2)
BILIRUB DIRECT SERPL-MCNC: 0.3 MG/DL (ref 0–0.2)
BILIRUB INDIRECT SERPL-MCNC: 0.4 MG/DL (ref 0–1)
BILIRUB SERPL-MCNC: 0.7 MG/DL (ref 0–1.2)
BUN SERPL-MCNC: 14 MG/DL (ref 8–23)
CALCIUM SERPL-MCNC: 9.5 MG/DL (ref 8.6–10.4)
CHLORIDE SERPL-SCNC: 100 MMOL/L (ref 98–107)
CHOLEST SERPL-MCNC: 121 MG/DL (ref 0–199)
CHOLESTEROL/HDL RATIO: 3.4
CO2 SERPL-SCNC: 28 MMOL/L (ref 20–31)
CREAT SERPL-MCNC: 1.1 MG/DL (ref 0.7–1.2)
EOSINOPHIL # BLD: 0.08 K/UL (ref 0–0.44)
EOSINOPHILS RELATIVE PERCENT: 1 % (ref 1–4)
ERYTHROCYTE [DISTWIDTH] IN BLOOD BY AUTOMATED COUNT: 13.4 % (ref 11.8–14.4)
EST. AVERAGE GLUCOSE BLD GHB EST-MCNC: 88 MG/DL
GFR, ESTIMATED: 71 ML/MIN/1.73M2
GLUCOSE SERPL-MCNC: 92 MG/DL (ref 74–99)
HBA1C MFR BLD: 4.7 % (ref 4–6)
HCT VFR BLD AUTO: 48.1 % (ref 40.7–50.3)
HDLC SERPL-MCNC: 36 MG/DL
HGB BLD-MCNC: 16.5 G/DL (ref 13–17)
IMM GRANULOCYTES # BLD AUTO: <0.03 K/UL (ref 0–0.3)
IMM GRANULOCYTES NFR BLD: 0 %
LDLC SERPL CALC-MCNC: 65 MG/DL (ref 0–100)
LYMPHOCYTES NFR BLD: 2.49 K/UL (ref 1.1–3.7)
LYMPHOCYTES RELATIVE PERCENT: 27 % (ref 24–43)
MCH RBC QN AUTO: 32 PG (ref 25.2–33.5)
MCHC RBC AUTO-ENTMCNC: 34.3 G/DL (ref 28.4–34.8)
MCV RBC AUTO: 93.2 FL (ref 82.6–102.9)
MONOCYTES NFR BLD: 0.77 K/UL (ref 0.1–1.2)
MONOCYTES NFR BLD: 8 % (ref 3–12)
NEUTROPHILS NFR BLD: 63 % (ref 36–65)
NEUTS SEG NFR BLD: 5.86 K/UL (ref 1.5–8.1)
NRBC BLD-RTO: 0 PER 100 WBC
PLATELET # BLD AUTO: 219 K/UL (ref 138–453)
PMV BLD AUTO: 10.6 FL (ref 8.1–13.5)
POTASSIUM SERPL-SCNC: 4.4 MMOL/L (ref 3.7–5.3)
PROT SERPL-MCNC: 6.8 G/DL (ref 6.6–8.7)
RBC # BLD AUTO: 5.16 M/UL (ref 4.21–5.77)
SODIUM SERPL-SCNC: 141 MMOL/L (ref 136–145)
TRIGL SERPL-MCNC: 100 MG/DL
TSH SERPL DL<=0.05 MIU/L-ACNC: 1.33 UIU/ML (ref 0.27–4.2)
VIT B12 SERPL-MCNC: 1168 PG/ML (ref 232–1245)
VLDLC SERPL CALC-MCNC: 20 MG/DL (ref 1–30)
WBC OTHER # BLD: 9.3 K/UL (ref 3.5–11.3)

## 2024-11-15 RX ORDER — VIBEGRON 75 MG/1
1 TABLET, FILM COATED ORAL DAILY
COMMUNITY

## 2024-11-15 RX ORDER — CARBIDOPA AND LEVODOPA 25; 100 MG/1; MG/1
1 TABLET ORAL 2 TIMES DAILY
COMMUNITY

## 2024-11-15 RX ORDER — ASPIRIN 325 MG
325 TABLET ORAL DAILY
COMMUNITY

## 2024-11-15 NOTE — PROGRESS NOTES
[facility]  [facility]  [address]  [facility] [place] [facility] [address]  Dept: [phone]     Date of Visit:  11/15/2024  Patient Name: Alexandro Kim   Patient :  1950     CHIEF COMPLAINT/HPI:     Alexandro Kim is a 74 y.o. male who presents today for an general visit to be evaluated for the following condition(s):  Chief Complaint   Patient presents with    Hypertension     Blood pressures have been up and down.  No energy.     Patient here for routine visit. He’s been feeling tired lately and he’s also noticed more shortness of breath on exertion. No cough fevers or chills. He was seen here recently with Back and right hip pain, given depomedrol 80mg IM, tizanadine and referred to PT. He said he sat around for a week and during this time he didn’t do much at all whereas normally he’s going to do water exercise a few days a week. earlier this week he didn’t feel right and checked his blood pressure. It was 170 systolic and when he checked it again it was 200 systolic. No Headache or chest pain with it. he started to move around more thereafter and His blood pressure is getting better . his body cuff that he brings in today runs 10 points lower than our reading here today. He stopped amlodopine as instructed last appt because his blood pressure was doing well and it was causing leg edema. He has not been on statin for a while since his lipids were well controlled without it. he uses CPAP but he has been waking around 4 am every AM for some reason, sometimes he gets back to sleep and sometimes not. He follows with Sleep medicine specialist who he will see soon for this. His Parkinson’s meds remain the same, sees neurologist in December.  REVIEW OF SYSTEM      Negative other than noted above      REVIEWED INFORMATION      Allergies   Allergen Reactions    Amlodipine      Leg edema    Ciprofloxacin      Leg cramping    Lipitor [Atorvastatin] Myalgia    Morphine

## 2024-11-16 LAB
MICROORGANISM SPEC CULT: NO GROWTH
SPECIMEN DESCRIPTION: NORMAL

## 2024-11-16 NOTE — ASSESSMENT & PLAN NOTE
today blood pressure is good but it has recently been elevated at home which might have been secondary to back pain and taking ibuprofen for this.  Now that he is becoming more active with diminished back pain, his blood pressure is coming down, continue to check blood pressure over the next week and give me update within that time, keep in mind his blood pressure cuff runs 10 points lower than ours.  Continue spironolactone Imdur and hydrochlorothiazide 12.5 mg daily; if blood pressure at home is running high will increase hydrochlorothiazide.  Amlodipine discontinued previously due to leg edema side effect.  Check kidney function now

## 2024-11-16 NOTE — ASSESSMENT & PLAN NOTE
check labs to evaluate for underlying etiology, with shortness of breath get echocardiogram, he is compliant with CPAP but having frequent waking which he will discuss with sleep medicine, continue regular exercise

## 2024-11-16 NOTE — ASSESSMENT & PLAN NOTE
compliant with CPAP, follows with sleep medicine specialist for management of this, and discuss with him frequent waking through the night recently, whether CPAP needs adjusting or sleeping

## 2024-11-16 NOTE — ASSESSMENT & PLAN NOTE
this has been controlled even with discontinuance of statin, continue control with life style changes and check labs soon

## 2024-12-09 ENCOUNTER — HOSPITAL ENCOUNTER (OUTPATIENT)
Age: 74
Discharge: HOME | End: 2024-12-11
Attending: FAMILY MEDICINE
Payer: MEDICARE

## 2024-12-09 VITALS — HEIGHT: 66 IN | WEIGHT: 270 LBS | BODY MASS INDEX: 43.39 KG/M2

## 2024-12-09 DIAGNOSIS — R06.02 SHORTNESS OF BREATH: ICD-10-CM

## 2024-12-09 DIAGNOSIS — R06.00 DYSPNEA, UNSPECIFIED TYPE: ICD-10-CM

## 2024-12-09 LAB
ECHO AO ROOT DIAM: 3.6 CM
ECHO AO ROOT INDEX: 1.59 CM/M2
ECHO AV MEAN GRADIENT: 2 MMHG
ECHO AV MEAN VELOCITY: 0.7 M/S
ECHO AV PEAK GRADIENT: 6 MMHG
ECHO AV PEAK VELOCITY: 1.2 M/S
ECHO AV VELOCITY RATIO: 0.67
ECHO AV VTI: 20.7 CM
ECHO BSA: 2.39 M2
ECHO LA AREA 2C: 16.5 CM2
ECHO LA AREA 4C: 28.1 CM2
ECHO LA DIAMETER INDEX: 1.67 CM/M2
ECHO LA DIAMETER: 3.8 CM
ECHO LA MAJOR AXIS: 6.7 CM
ECHO LA MINOR AXIS: 5.4 CM
ECHO LA TO AORTIC ROOT RATIO: 1.06
ECHO LA VOL BP: 70 ML (ref 18–58)
ECHO LA VOL MOD A2C: 41 ML (ref 18–58)
ECHO LA VOL MOD A4C: 99 ML (ref 18–58)
ECHO LA VOL/BSA BIPLANE: 31 ML/M2 (ref 16–34)
ECHO LA VOLUME INDEX MOD A2C: 18 ML/M2 (ref 16–34)
ECHO LA VOLUME INDEX MOD A4C: 44 ML/M2 (ref 16–34)
ECHO LV E' LATERAL VELOCITY: 4.35 CM/S
ECHO LV E' SEPTAL VELOCITY: 3.7 CM/S
ECHO LV EF PHYSICIAN: 55 %
ECHO LV FRACTIONAL SHORTENING: 31 % (ref 28–44)
ECHO LV INTERNAL DIMENSION DIASTOLE INDEX: 1.85 CM/M2
ECHO LV INTERNAL DIMENSION DIASTOLIC: 4.2 CM (ref 4.2–5.9)
ECHO LV INTERNAL DIMENSION SYSTOLIC INDEX: 1.28 CM/M2
ECHO LV INTERNAL DIMENSION SYSTOLIC: 2.9 CM
ECHO LV IVSD: 1.1 CM (ref 0.6–1)
ECHO LV MASS 2D: 157.1 G (ref 88–224)
ECHO LV MASS INDEX 2D: 69.2 G/M2 (ref 49–115)
ECHO LV POSTERIOR WALL DIASTOLIC: 1.1 CM (ref 0.6–1)
ECHO LV RELATIVE WALL THICKNESS RATIO: 0.52
ECHO LVOT PEAK GRADIENT: 3 MMHG
ECHO LVOT PEAK VELOCITY: 0.8 M/S
ECHO MV A VELOCITY: 0.79 M/S
ECHO MV E DECELERATION TIME (DT): 264 MS
ECHO MV E VELOCITY: 0.58 M/S
ECHO MV E/A RATIO: 0.73
ECHO MV E/E' LATERAL: 13.33
ECHO MV E/E' RATIO (AVERAGED): 14.5
ECHO MV E/E' SEPTAL: 15.68

## 2024-12-09 PROCEDURE — 93306 TTE W/DOPPLER COMPLETE: CPT

## 2024-12-10 ENCOUNTER — TELEPHONE (OUTPATIENT)
Age: 74
End: 2024-12-10

## 2024-12-10 NOTE — TELEPHONE ENCOUNTER
Patient would like echo results he had done at [facility] yesterday - 12/09

## 2024-12-20 ENCOUNTER — NURSE ONLY (OUTPATIENT)
Age: 74
End: 2024-12-20

## 2024-12-20 NOTE — PROGRESS NOTES
Ear Cerumen Removal Procedure Note    Indication: ear cerumen impaction    Procedure: After placing the patient's head in the appropriate position, the patient's both ear canal was irrigated with the appropriate solution until all cerumen was removed and the ear canal was clear.  At this point, the procedure was complete.     The patient tolerated the procedure well.    Complications: None

## 2024-12-27 ENCOUNTER — OFFICE VISIT (OUTPATIENT)
Dept: PRIMARY CARE CLINIC | Age: 74
End: 2024-12-27
Payer: MEDICARE

## 2024-12-27 VITALS
SYSTOLIC BLOOD PRESSURE: 121 MMHG | OXYGEN SATURATION: 95 % | DIASTOLIC BLOOD PRESSURE: 68 MMHG | HEART RATE: 75 BPM | TEMPERATURE: 97.6 F

## 2024-12-27 DIAGNOSIS — H65.191 ACUTE NON-SUPPURATIVE OTITIS MEDIA, RIGHT: Primary | ICD-10-CM

## 2024-12-27 DIAGNOSIS — L53.8 ERYTHEMA OF EXTERNAL EAR: ICD-10-CM

## 2024-12-27 PROCEDURE — 1159F MED LIST DOCD IN RCRD: CPT

## 2024-12-27 PROCEDURE — 1160F RVW MEDS BY RX/DR IN RCRD: CPT

## 2024-12-27 PROCEDURE — G8484 FLU IMMUNIZE NO ADMIN: HCPCS

## 2024-12-27 PROCEDURE — G8417 CALC BMI ABV UP PARAM F/U: HCPCS

## 2024-12-27 PROCEDURE — 1036F TOBACCO NON-USER: CPT

## 2024-12-27 PROCEDURE — 3078F DIAST BP <80 MM HG: CPT

## 2024-12-27 PROCEDURE — 3017F COLORECTAL CA SCREEN DOC REV: CPT

## 2024-12-27 PROCEDURE — G8427 DOCREV CUR MEDS BY ELIG CLIN: HCPCS

## 2024-12-27 PROCEDURE — 3074F SYST BP LT 130 MM HG: CPT

## 2024-12-27 PROCEDURE — 99213 OFFICE O/P EST LOW 20 MIN: CPT

## 2024-12-27 PROCEDURE — 1123F ACP DISCUSS/DSCN MKR DOCD: CPT

## 2024-12-27 RX ORDER — AMOXICILLIN 875 MG/1
875 TABLET, COATED ORAL 2 TIMES DAILY
Qty: 20 TABLET | Refills: 0 | Status: SHIPPED | OUTPATIENT
Start: 2024-12-27 | End: 2025-01-06

## 2024-12-27 RX ORDER — RASAGILINE 1 MG/1
1 TABLET ORAL DAILY
COMMUNITY
Start: 2024-12-09

## 2024-12-27 RX ORDER — NEOMYCIN SULFATE, POLYMYXIN B SULFATE, HYDROCORTISONE 3.5; 10000; 1 MG/ML; [USP'U]/ML; MG/ML
3 SOLUTION/ DROPS AURICULAR (OTIC) 3 TIMES DAILY
Qty: 1 EACH | Refills: 0 | Status: SHIPPED | OUTPATIENT
Start: 2024-12-27 | End: 2025-01-06

## 2024-12-27 ASSESSMENT — ENCOUNTER SYMPTOMS
CHEST TIGHTNESS: 0
FACIAL SWELLING: 0
APNEA: 0
SHORTNESS OF BREATH: 0
VOICE CHANGE: 0
BLOOD IN STOOL: 0
CONSTIPATION: 0
RESPIRATORY NEGATIVE: 1
SORE THROAT: 0
COLOR CHANGE: 0
ABDOMINAL DISTENTION: 0
CHOKING: 0
EYE ITCHING: 0
EYE PAIN: 0
SINUS PRESSURE: 0
EYE DISCHARGE: 0
VOMITING: 0
NAUSEA: 0
EYE REDNESS: 0
RHINORRHEA: 0
BACK PAIN: 0
ANAL BLEEDING: 0
SINUS PAIN: 0
RECTAL PAIN: 0
ABDOMINAL PAIN: 0
WHEEZING: 0
PHOTOPHOBIA: 0
COUGH: 0
TROUBLE SWALLOWING: 0
EYES NEGATIVE: 1
GASTROINTESTINAL NEGATIVE: 1
DIARRHEA: 0
STRIDOR: 0

## 2024-12-27 NOTE — PROGRESS NOTES
Pulses: Normal pulses.      Heart sounds: Normal heart sounds.   Pulmonary:      Effort: Pulmonary effort is normal.      Breath sounds: Normal breath sounds and air entry.   Musculoskeletal:      Cervical back: Normal range of motion and neck supple.   Lymphadenopathy:      Cervical: Cervical adenopathy present.   Skin:     General: Skin is warm and dry.      Capillary Refill: Capillary refill takes less than 2 seconds.   Neurological:      General: No focal deficit present.      Mental Status: He is alert and oriented to person, place, and time. Mental status is at baseline.      GCS: GCS eye subscore is 4. GCS verbal subscore is 5. GCS motor subscore is 6.   Psychiatric:         Mood and Affect: Mood normal.         Behavior: Behavior normal.         Plan:     Assessment & Plan     1. Acute non-suppurative otitis media, right  -     amoxicillin (AMOXIL) 875 MG tablet; Take 1 tablet by mouth 2 times daily for 10 days, Disp-20 tablet, R-0Normal  2. Erythema of external ear  -     neomycin-polymyxin-hydrocortisone (CORTISPORIN) 3.5-49863-1 otic solution; Place 3 drops in ear(s) 3 times daily for 10 days, Disp-1 each, R-0Normal     -Continue over-the-counter medications as needed for symptoms such as Tylenol/Motrin prn for pain  -Go to the ER for shortness of breath, chest pain.    -Patient verbalized understanding.    Follow Up Instructions:      Return if symptoms worsen or fail to improve, for SOB, chest pain go to ER.    Orders Placed This Encounter   Medications    neomycin-polymyxin-hydrocortisone (CORTISPORIN) 3.5-83284-9 otic solution     Sig: Place 3 drops in ear(s) 3 times daily for 10 days     Dispense:  1 each     Refill:  0    amoxicillin (AMOXIL) 875 MG tablet     Sig: Take 1 tablet by mouth 2 times daily for 10 days     Dispense:  20 tablet     Refill:  0           Patient instructed to complete antibiotic prescription fully.  May use Motrin/Tylenol for fever/pain.  Warm compresses as desired for ear

## 2025-01-10 ENCOUNTER — TELEPHONE (OUTPATIENT)
Age: 75
End: 2025-01-10

## 2025-01-10 NOTE — TELEPHONE ENCOUNTER
Patient is calling he had his ear flushed ad then was on a antibiotic for about a week (amoxicillin) patient still can not hear out of his right ear no fever or pain please advise  sending to doctor in office

## 2025-01-13 ENCOUNTER — OFFICE VISIT (OUTPATIENT)
Age: 75
End: 2025-01-13

## 2025-01-13 VITALS
OXYGEN SATURATION: 94 % | SYSTOLIC BLOOD PRESSURE: 128 MMHG | HEIGHT: 66 IN | BODY MASS INDEX: 44.36 KG/M2 | RESPIRATION RATE: 12 BRPM | DIASTOLIC BLOOD PRESSURE: 88 MMHG | WEIGHT: 276 LBS | HEART RATE: 62 BPM

## 2025-01-13 DIAGNOSIS — H61.23 HEARING LOSS OF BOTH EARS DUE TO CERUMEN IMPACTION: Primary | ICD-10-CM

## 2025-01-13 RX ORDER — NEOMYCIN SULFATE, POLYMYXIN B SULFATE, HYDROCORTISONE 3.5; 10000; 1 MG/ML; [USP'U]/ML; MG/ML
SOLUTION/ DROPS AURICULAR (OTIC)
COMMUNITY
Start: 2024-12-27

## 2025-01-13 SDOH — ECONOMIC STABILITY: FOOD INSECURITY: WITHIN THE PAST 12 MONTHS, YOU WORRIED THAT YOUR FOOD WOULD RUN OUT BEFORE YOU GOT MONEY TO BUY MORE.: NEVER TRUE

## 2025-01-13 SDOH — ECONOMIC STABILITY: FOOD INSECURITY: WITHIN THE PAST 12 MONTHS, THE FOOD YOU BOUGHT JUST DIDN'T LAST AND YOU DIDN'T HAVE MONEY TO GET MORE.: NEVER TRUE

## 2025-01-13 ASSESSMENT — PATIENT HEALTH QUESTIONNAIRE - PHQ9
SUM OF ALL RESPONSES TO PHQ QUESTIONS 1-9: 0
SUM OF ALL RESPONSES TO PHQ QUESTIONS 1-9: 0
SUM OF ALL RESPONSES TO PHQ9 QUESTIONS 1 & 2: 0
1. LITTLE INTEREST OR PLEASURE IN DOING THINGS: NOT AT ALL
2. FEELING DOWN, DEPRESSED OR HOPELESS: NOT AT ALL
SUM OF ALL RESPONSES TO PHQ QUESTIONS 1-9: 0
SUM OF ALL RESPONSES TO PHQ QUESTIONS 1-9: 0

## 2025-01-13 NOTE — PROGRESS NOTES
[facility]  [facility]  [address]  [facility] [address]  Dept: [phone]     Date of Visit:  2025  Patient Name: Alexandro Kim   Patient :  1950     Subjective  Alexandro Kim, 74 y.o. male presents today with:  Chief Complaint   Patient presents with    Ear Fullness     Right side       History of Present Illness  The patient is a 74-year-old gentleman who presents today with right ear pain or pressure and decreased hearing.    He sought medical attention at an urgent care facility following [place], where he was diagnosed with an ear infection and subsequently prescribed antibiotics. He experienced pain during this period, which has since resolved. However, he continues to report hearing impairment. He reports no ear drainage. He also reports no systemic symptoms such as fevers or chills. His medical history includes a visit to this clinic in early December for ear irrigation due to cerumen impaction, which resulted in improved auditory function.    He is inquiring about Gemtesa, a medication prescribed by his urologist, which was not approved by his insurance.    Past Medical History:   Diagnosis Date    Aerophagia     Basal cell carcinoma     left lower leg    BPH (benign prostatic hyperplasia)     CVD (cardiovascular disease)     DDD (degenerative disc disease), lumbar     Dyslipidemia     Hyperlipidemia     Hypertension     Irritable bowel syndrome     Onychomycosis     JOEY on CPAP     Osteoarthritis     hips moderate per xray, spine    Parkinson's disease (HCC)     Restless legs syndrome     Stage 3b chronic kidney disease (CKD) ([facility])     Xerosis cutis      Past Surgical History:   Procedure Laterality Date    COLONOSCOPY  10/18/2021    erica; polyps    JOINT REPLACEMENT Right 2018    Total Knee  Arthroplasty    KNEE SURGERY Left     \"partial\" knee surgery    SHOULDER SURGERY Bilateral     Rotator Cuff    SKIN CANCER

## 2025-01-14 NOTE — PROGRESS NOTES
Ear Cerumen Removal Procedure Note    Indication: ear cerumen impaction    Procedure: After placing the patient's head in the appropriate position, the patient's left ear canal was irrigated with the appropriate solution and curetted until all cerumen was removed and the ear canal was clear.  The other ear canal also had cerumen present and was irrigated with the appropriate solution and curetted until all cerumen was removed and the ear canal was clear. At this point the procedure was complete.     The patient tolerated the procedure well.    Complications: None

## 2025-02-21 RX ORDER — GABAPENTIN 800 MG/1
800 TABLET ORAL NIGHTLY
Qty: 90 TABLET | Refills: 0 | Status: SHIPPED | OUTPATIENT
Start: 2025-02-21 | End: 2025-05-22

## 2025-02-21 NOTE — TELEPHONE ENCOUNTER
Alexandro Kim is calling to request a refill on the following medication(s):    Medication Request:  Requested Prescriptions     Pending Prescriptions Disp Refills    gabapentin (NEURONTIN) 800 MG tablet 90 tablet 0     Sig: Take 1 tablet by mouth nightly for 90 days.       Last Visit Date (If Applicable):  1/13/2025    Next Visit Date:    4/2/2025

## 2025-02-21 NOTE — TELEPHONE ENCOUNTER
Patient called to request med renewal of   Gabapentin 800 tabs, 1 daily, #90  Pharmacy:  [facility]

## 2025-02-27 RX ORDER — NEOMYCIN SULFATE, POLYMYXIN B SULFATE, HYDROCORTISONE 3.5; 10000; 1 MG/ML; [USP'U]/ML; MG/ML
SOLUTION/ DROPS AURICULAR (OTIC)
Qty: 10 ML | OUTPATIENT
Start: 2025-02-27

## 2025-03-21 RX ORDER — NEOMYCIN SULFATE, POLYMYXIN B SULFATE, HYDROCORTISONE 3.5; 10000; 1 MG/ML; [USP'U]/ML; MG/ML
SOLUTION/ DROPS AURICULAR (OTIC)
Qty: 10 ML | Refills: 0 | Status: SHIPPED | OUTPATIENT
Start: 2025-03-21

## 2025-04-02 ENCOUNTER — OFFICE VISIT (OUTPATIENT)
Age: 75
End: 2025-04-02

## 2025-04-02 VITALS
SYSTOLIC BLOOD PRESSURE: 124 MMHG | RESPIRATION RATE: 14 BRPM | DIASTOLIC BLOOD PRESSURE: 72 MMHG | HEART RATE: 71 BPM | WEIGHT: 277 LBS | OXYGEN SATURATION: 96 % | BODY MASS INDEX: 44.52 KG/M2 | HEIGHT: 66 IN

## 2025-04-02 DIAGNOSIS — E66.813 OBESITY, CLASS 3: ICD-10-CM

## 2025-04-02 DIAGNOSIS — I10 ESSENTIAL HYPERTENSION: ICD-10-CM

## 2025-04-02 DIAGNOSIS — E53.8 VITAMIN B12 DEFICIENCY: ICD-10-CM

## 2025-04-02 DIAGNOSIS — Z71.85 IMMUNIZATION COUNSELING: ICD-10-CM

## 2025-04-02 DIAGNOSIS — E78.5 DYSLIPIDEMIA: Primary | ICD-10-CM

## 2025-04-02 DIAGNOSIS — E55.9 VITAMIN D DEFICIENCY: ICD-10-CM

## 2025-04-02 DIAGNOSIS — G47.39 OTHER SLEEP APNEA: ICD-10-CM

## 2025-04-02 DIAGNOSIS — G20.A1 PARKINSON'S DISEASE, UNSPECIFIED WHETHER DYSKINESIA PRESENT, UNSPECIFIED WHETHER MANIFESTATIONS FLUCTUATE (HCC): ICD-10-CM

## 2025-04-02 DIAGNOSIS — Z23 ENCOUNTER FOR IMMUNIZATION: ICD-10-CM

## 2025-04-02 DIAGNOSIS — R53.83 FATIGUE, UNSPECIFIED TYPE: ICD-10-CM

## 2025-04-02 PROBLEM — N18.32 STAGE 3B CHRONIC KIDNEY DISEASE (CKD) (HCC): Status: RESOLVED | Noted: 2023-11-16 | Resolved: 2025-04-02

## 2025-04-02 RX ORDER — AMOXICILLIN 500 MG/1
CAPSULE ORAL
COMMUNITY
Start: 2025-02-12 | End: 2025-04-02 | Stop reason: ALTCHOICE

## 2025-04-02 NOTE — PROGRESS NOTES
[facility]  [facility]  [address]  [facility] [address]  Dept: [phone]     Date of Visit:  2025  Patient Name: Alexandro Kim   Patient :  1950     Subjective  Alexandro Kim, 74 y.o. male presents today with:  Chief Complaint   Patient presents with    Discuss Labs    Discuss Medications       History of Present Illness  The patient is a 74-year-old male who presents for a routine visit. He has a history of Parkinson's disease, obstructive sleep apnea (JOEY), hypertension, dyslipidemia, and osteoarthritis.    Persistent fatigue is reported, which was previously discussed with his neurologist, Dr. Tovar, who suggested that it might be a symptom of Parkinson's disease. The potential use of Adderall as a treatment option was also mentioned during their conversation.    No adjustments to the CPAP machine have been required, and awakenings at night continue to be experienced but altogether he does feel he sleeps well with the CPAP.    Adherence to the prescribed regimen of spironolactone 25 mg, Imdur 60 mg, and hydrochlorothiazide is expressed, although uncertainty if he is actually taking imdur.    Flomax and dutasteride are being taken. A scheduled appointment with the urologist, Dr. Hill, is noted for 2025.    The pneumonia vaccine has been tolerated well, and interest in receiving the booster today is expressed. The influenza vaccine is declined due to a previous adverse reaction.    Past Medical History:   Diagnosis Date    Aerophagia     Basal cell carcinoma     left lower leg    BPH (benign prostatic hyperplasia)     CVD (cardiovascular disease)     DDD (degenerative disc disease), lumbar     Dyslipidemia     Hyperlipidemia     Hypertension     Irritable bowel syndrome     Onychomycosis     JOEY on CPAP     Osteoarthritis     hips moderate per xray, spine    Parkinson's disease     Restless legs syndrome     Stage 3b chronic kidney

## 2025-04-02 NOTE — PROGRESS NOTES
After obtaining consent, and per orders of Dr. Gilliam, injection of Prevnar-20 given in Left deltoid by José Velasquez MA. Patient tolerated the injection well.

## 2025-05-09 ENCOUNTER — TELEPHONE (OUTPATIENT)
Age: 75
End: 2025-05-09

## 2025-05-09 NOTE — TELEPHONE ENCOUNTER
Patient notified, verbalized understanding.  He did say he did not want to have to go to an ER.  I reiterated that Dr Gilliam is recommending ER due to the severity of his symptoms.

## 2025-05-09 NOTE — TELEPHONE ENCOUNTER
Patient called to ask if he could see Dr Gilliam for his bps are  179/ 189/  222/92 getting headaches  other bottom numbers were 89 96.   Readings from yesterday. He said he has a headache this morning, rates 5 or 6.

## 2025-05-20 RX ORDER — ISOSORBIDE MONONITRATE 60 MG/1
60 TABLET, EXTENDED RELEASE ORAL EVERY MORNING
Qty: 90 TABLET | Refills: 1 | Status: SHIPPED | OUTPATIENT
Start: 2025-05-20

## 2025-05-20 NOTE — TELEPHONE ENCOUNTER
Alexandro Kim is calling to request a refill on the following medication(s):    Medication Request:  Requested Prescriptions     Pending Prescriptions Disp Refills    isosorbide mononitrate (IMDUR) 60 MG extended release tablet [Pharmacy Med Name: ISOSORBIDE MONONIT ER 60 MG TB] 90 tablet 1     Sig: TAKE 1 TABLET BY MOUTH EVERY MORNING       Last Visit Date (If Applicable):  4/2/2025    Next Visit Date:    5/29/2025

## 2025-05-29 ENCOUNTER — OFFICE VISIT (OUTPATIENT)
Age: 75
End: 2025-05-29

## 2025-05-29 VITALS
BODY MASS INDEX: 43.77 KG/M2 | OXYGEN SATURATION: 94 % | SYSTOLIC BLOOD PRESSURE: 124 MMHG | DIASTOLIC BLOOD PRESSURE: 72 MMHG | HEART RATE: 74 BPM | TEMPERATURE: 97.9 F | WEIGHT: 271.2 LBS

## 2025-05-29 DIAGNOSIS — N40.1 BENIGN PROSTATIC HYPERPLASIA WITH URINARY FREQUENCY: ICD-10-CM

## 2025-05-29 DIAGNOSIS — I10 BENIGN ESSENTIAL HYPERTENSION: Primary | ICD-10-CM

## 2025-05-29 DIAGNOSIS — G20.A1 PARKINSON'S DISEASE, UNSPECIFIED WHETHER DYSKINESIA PRESENT, UNSPECIFIED WHETHER MANIFESTATIONS FLUCTUATE (HCC): ICD-10-CM

## 2025-05-29 DIAGNOSIS — R60.0 LEG EDEMA: ICD-10-CM

## 2025-05-29 DIAGNOSIS — E78.5 DYSLIPIDEMIA: ICD-10-CM

## 2025-05-29 DIAGNOSIS — R35.0 BENIGN PROSTATIC HYPERPLASIA WITH URINARY FREQUENCY: ICD-10-CM

## 2025-05-29 DIAGNOSIS — G47.30 SLEEP APNEA, UNSPECIFIED TYPE: ICD-10-CM

## 2025-05-29 RX ORDER — FUROSEMIDE 20 MG/1
TABLET ORAL
Qty: 30 TABLET | Refills: 1 | Status: SHIPPED | OUTPATIENT
Start: 2025-05-29

## 2025-05-29 RX ORDER — AMLODIPINE BESYLATE 10 MG/1
10 TABLET ORAL DAILY
COMMUNITY
Start: 2025-05-12

## 2025-05-29 NOTE — PROGRESS NOTES
[facility]  [facility]  [address]  [facility] [address]  Dept: [phone]     Date of Visit:  2025  Patient Name: Alexandro Kim   Patient :  1950     Subjective  Alexandro Kim, 74 y.o. male presents today with:  Chief Complaint   Patient presents with    Headache     Having headaches for the past couple of months, different times during the day, last for hours.  Has not had any this week.  Not using C-Pap.  *Taking Amlodipine but it's listed on allergies - leg swelling. Ordered 25, under \"Outside Medications\" and on his med list from home.  Went to ED on 25 for headache - CT and CXR.       History of Present Illness  The patient is a 74-year-old male who presents for a routine visit following a recent ER visit where he was evaluated for elevated blood pressure, with systolic readings in the 200s, accompanied by a headache. He has a history of hypertension, Parkinson's disease, obstructive sleep apnea (JOEY), and benign prostatic hyperplasia (BPH). He has been experiencing more headaches in the last few weeks and has not been using his CPAP.    In the ER, a CT scan of the brain showed no acute findings. Laboratory tests, including troponin and BNP, were normal. His blood pressure was not significantly elevated during the ER visit, and no intervention was required. He reports that his headaches have subsided in the past few days. Prior to this, he experienced headaches lasting for hours, described as sharp pain distributed throughout his head. He also reported neck pain but did not experience any associated nausea, vomiting, or vision changes. The headaches occurred both upon waking and throughout the day. He has been taking over-the-counter Advil daily, which provided relief.    He has been monitoring his blood pressure at home, with readings ranging from 150 to 180 last week and in the 130s this week. He has been taking

## 2025-05-29 NOTE — PATIENT INSTRUCTIONS
Alexandro    Thank you for choosing [facility].  We know you have options when it comes to your healthcare; we appreciate that you chose us. Our goal is to provide exceptional  service and world class care to every patient.  You will be receiving a survey via email or text message asking for your feedback.  Please take a few minutes to share your thoughts about your recent visit. Your comments help us understand what we do well and ways we can improve.  Thank you in advance for your valuable feedback.      Dr. NEIL Hoyos LPN

## 2025-06-17 ENCOUNTER — OFFICE VISIT (OUTPATIENT)
Dept: PRIMARY CARE CLINIC | Age: 75
End: 2025-06-17

## 2025-06-17 ENCOUNTER — HOSPITAL ENCOUNTER (OUTPATIENT)
Age: 75
Setting detail: SPECIMEN
Discharge: HOME | End: 2025-06-17

## 2025-06-17 VITALS
HEART RATE: 77 BPM | TEMPERATURE: 98.8 F | OXYGEN SATURATION: 94 % | SYSTOLIC BLOOD PRESSURE: 108 MMHG | DIASTOLIC BLOOD PRESSURE: 60 MMHG

## 2025-06-17 DIAGNOSIS — N39.0 URINARY TRACT INFECTION WITHOUT HEMATURIA, SITE UNSPECIFIED: Primary | ICD-10-CM

## 2025-06-17 DIAGNOSIS — N39.0 URINARY TRACT INFECTION WITHOUT HEMATURIA, SITE UNSPECIFIED: ICD-10-CM

## 2025-06-17 DIAGNOSIS — R10.9 RIGHT FLANK PAIN: ICD-10-CM

## 2025-06-17 LAB
BILIRUBIN, POC: ABNORMAL
BLOOD URINE, POC: ABNORMAL
CLARITY, POC: CLEAR
COLOR, POC: YELLOW
GLUCOSE URINE, POC: ABNORMAL MG/DL
KETONES, POC: ABNORMAL MG/DL
LEUKOCYTE EST, POC: ABNORMAL
NITRITE, POC: ABNORMAL
PH, POC: 6
PROTEIN, POC: 30 MG/DL
SPECIFIC GRAVITY, POC: 1.02
UROBILINOGEN, POC: 0.2 MG/DL

## 2025-06-17 RX ORDER — IBUPROFEN 800 MG/1
TABLET, FILM COATED ORAL
COMMUNITY
Start: 2025-05-28

## 2025-06-17 RX ORDER — SULFAMETHOXAZOLE AND TRIMETHOPRIM 800; 160 MG/1; MG/1
1 TABLET ORAL 2 TIMES DAILY
Qty: 14 TABLET | Refills: 0 | Status: SHIPPED | OUTPATIENT
Start: 2025-06-17 | End: 2025-06-24

## 2025-06-17 RX ORDER — MIRABEGRON 50 MG/1
50 TABLET, FILM COATED, EXTENDED RELEASE ORAL DAILY
COMMUNITY
Start: 2025-05-08

## 2025-06-17 ASSESSMENT — ENCOUNTER SYMPTOMS
WHEEZING: 0
BOWEL INCONTINENCE: 0
BACK PAIN: 1
VOMITING: 0
DIARRHEA: 0
RESPIRATORY NEGATIVE: 1
CONSTIPATION: 0
NAUSEA: 0
COUGH: 0
ABDOMINAL PAIN: 0
SHORTNESS OF BREATH: 0
CHEST TIGHTNESS: 0

## 2025-06-17 NOTE — PROGRESS NOTES
[facility]  [address] [facility] [address]  Dept: [phone]     Alexandro Kim is a 74 y.o. male who presents to the urgent care today for his medicalconditions/complaints as noted below.  Alexandro Kim is c/o of Lower Back Pain (On rt side when inhaling x 1.5 wks)    HPI:     Back Pain  This is a new problem. The current episode started 1 to 4 weeks ago. The problem is unchanged. The pain is present in the thoracic spine (right side). The pain does not radiate. The pain is moderate. Exacerbated by: deep breaths. Pertinent negatives include no abdominal pain, bladder incontinence, bowel incontinence, chest pain, dysuria, fever, headaches, leg pain, paresis, paresthesias, tingling or weakness. He has tried nothing for the symptoms. The treatment provided no relief.       Past Medical History:   Diagnosis Date    Aerophagia     Basal cell carcinoma     left lower leg    BPH (benign prostatic hyperplasia)     CVD (cardiovascular disease)     DDD (degenerative disc disease), lumbar     Dyslipidemia     Hyperlipidemia     Hypertension     Irritable bowel syndrome     Onychomycosis     JOEY on CPAP     Osteoarthritis     hips moderate per xray, spine    Parkinson's disease (HCC)     Restless legs syndrome     Stage 3b chronic kidney disease (CKD) (HCC)     Xerosis cutis      Current Outpatient Medications   Medication Sig Dispense Refill    ibuprofen (ADVIL;MOTRIN) 800 MG tablet       sulfamethoxazole-trimethoprim (BACTRIM DS;SEPTRA DS) 800-160 MG per tablet Take 1 tablet by mouth 2 times daily for 7 days 14 tablet 0    amLODIPine (NORVASC) 10 MG tablet Take 1 tablet by mouth daily      furosemide (LASIX) 20 MG tablet Take 1 po 2 - 3 X weekly as needed for leg swelling 30 tablet 1    isosorbide mononitrate (IMDUR) 60 MG extended release tablet TAKE 1 TABLET BY MOUTH EVERY MORNING 90 tablet 1    Misc. Devices (CPAP MACHINE) MISC

## 2025-06-18 LAB
MICROORGANISM SPEC CULT: NORMAL
SERVICE CMNT-IMP: NORMAL
SPECIMEN DESCRIPTION: NORMAL

## 2025-06-19 ENCOUNTER — RESULTS FOLLOW-UP (OUTPATIENT)
Dept: PRIMARY CARE CLINIC | Age: 75
End: 2025-06-19

## 2025-06-23 RX ORDER — HYDROCHLOROTHIAZIDE 12.5 MG/1
12.5 TABLET ORAL DAILY
Qty: 90 TABLET | Refills: 1 | Status: SHIPPED | OUTPATIENT
Start: 2025-06-23

## 2025-06-23 NOTE — TELEPHONE ENCOUNTER
Alexandro Kim is calling to request a refill on the following medication(s):    Medication Request:  Requested Prescriptions     Pending Prescriptions Disp Refills    hydroCHLOROthiazide 12.5 MG tablet [Pharmacy Med Name: hydroCHLOROthiazide 12.5 MG TABLET] 90 tablet      Sig: TAKE 1 TABLET BY MOUTH DAILY       Last Visit Date (If Applicable):  5/29/2025    Next Visit Date:    10/15/2025

## 2025-07-14 ENCOUNTER — TELEPHONE (OUTPATIENT)
Age: 75
End: 2025-07-14

## 2025-07-14 RX ORDER — IPRATROPIUM BROMIDE 21 UG/1
2 SPRAY, METERED NASAL EVERY 12 HOURS
Qty: 30 ML | Refills: 2 | Status: SHIPPED | OUTPATIENT
Start: 2025-07-14

## 2025-07-14 RX ORDER — IPRATROPIUM BROMIDE 21 UG/1
2 SPRAY, METERED NASAL EVERY 12 HOURS
COMMUNITY
End: 2025-07-14 | Stop reason: SDUPTHER

## 2025-07-14 NOTE — TELEPHONE ENCOUNTER
Alexandro Kim is calling to request a refill on the following medication(s):    Medication Request:  Requested Prescriptions     Pending Prescriptions Disp Refills    ipratropium (ATROVENT) 0.03 % nasal spray 30 mL 2     Si sprays by Each Nostril route in the morning and 2 sprays in the evening.       Last Visit Date (If Applicable):  2025    Next Visit Date:    10/15/2025

## 2025-07-14 NOTE — TELEPHONE ENCOUNTER
Patient states his ears are all waxed up and would like to come in for a nurse visit to get them flushed. Is this okay to schedule?

## 2025-07-16 ENCOUNTER — CLINICAL SUPPORT (OUTPATIENT)
Age: 75
End: 2025-07-16
Payer: MEDICARE

## 2025-07-16 DIAGNOSIS — H61.23 IMPACTED CERUMEN OF BOTH EARS: Primary | ICD-10-CM

## 2025-07-16 PROCEDURE — 69209 REMOVE IMPACTED EAR WAX UNI: CPT | Performed by: FAMILY MEDICINE

## 2025-07-16 NOTE — PROGRESS NOTES
Ear Cerumen Removal Procedure Note    Indication: ear cerumen impaction    Procedure: After placing the patient's head in the appropriate position, the patient's left and right ear canal was irrigated with the appropriate solution until all cerumen was removed and the ear canal was clear.  At this point, the procedure was complete.     The patient tolerated the procedure well.    Complications: None    Ears were checked by Dr. NEIL Gilliam and patient was discharged.

## 2025-07-30 NOTE — TELEPHONE ENCOUNTER
Alexandro Kim is calling to request a refill on the following medication(s):    Medication Request:  Requested Prescriptions     Pending Prescriptions Disp Refills    amLODIPine (NORVASC) 10 MG tablet [Pharmacy Med Name: amLODIPine BESYLATE 10MG TAB] 84 tablet      Sig: TAKE ONE TABLET BY MOUTH DAILY       Last Visit Date (If Applicable):  7/16/2025    Next Visit Date:    10/15/2025

## 2025-08-03 RX ORDER — AMLODIPINE BESYLATE 10 MG/1
10 TABLET ORAL DAILY
Qty: 84 TABLET | Refills: 1 | Status: SHIPPED | OUTPATIENT
Start: 2025-08-03